# Patient Record
(demographics unavailable — no encounter records)

---

## 2025-02-07 NOTE — HISTORY OF PRESENT ILLNESS
[de-identified] : Pt presents with complaints of neck and upper back pain.  In mid December, had L periscapular pain that radiated to left neck and front of chest.  The pain is variable, provoked by sudden movements, sneezing, flexion.  She stopped exercising 1.5 weeks ago.  Tried massage and chiropractor, otc pain meds.   Denies significant arm pain.  Denies numbness, tingling.

## 2025-02-07 NOTE — DISCUSSION/SUMMARY
[de-identified] : A lengthy discussion was had with the patient regarding her condition.   Rx MRI cervical spine- r/o HNP, stenosis.   Rx MRI L shoulder- r/o RC injury.   Can review results and plan via phone or telehealth. The patient's questions were sought and answered satisfactorily.  Taina RUIZ NP am acting as a scribe for Dr. Frank Schwab.

## 2025-02-07 NOTE — PHYSICAL EXAM
[de-identified] : NVI to the extremities.  L shoulder pain with ROM, external rotation. [de-identified] : Cervical xrays with flex/ext 2/5/2025:  C3-4 subtle retrolisthesis with mild translation

## 2025-02-07 NOTE — HISTORY OF PRESENT ILLNESS
[de-identified] : Pt presents with complaints of neck and upper back pain.  In mid December, had L periscapular pain that radiated to left neck and front of chest.  The pain is variable, provoked by sudden movements, sneezing, flexion.  She stopped exercising 1.5 weeks ago.  Tried massage and chiropractor, otc pain meds.   Denies significant arm pain.  Denies numbness, tingling.

## 2025-04-03 NOTE — HISTORY OF PRESENT ILLNESS
[de-identified] : Pt presents in follow up for left upper back/shoulder pain, periscapular pain aggravated by activity.   She completed 6 weeks of physical therapy.  Although she felt slight improvement after therapy, the pain returned the next day.  She continues to take nsaids, uses heat, ice.    2/5/2025:  Pt presents with complaints of neck and upper back pain. In mid December, had L periscapular pain that radiated to left neck and front of chest. The pain is variable, provoked by sudden movements, sneezing, flexion. She stopped exercising 1.5 weeks ago. Tried massage and chiropractor, otc pain meds. Denies significant arm pain. Denies numbness, tingling.

## 2025-04-03 NOTE — PHYSICAL EXAM
[de-identified] : NVI to the extremities. L shoulder pain with ROM, external rotation. [de-identified] : Cervical xrays with flex/ext 2/5/2025: C3-4 subtle retrolisthesis with mild translation.

## 2025-04-03 NOTE — PHYSICAL EXAM
[de-identified] : NVI to the extremities. L shoulder pain with ROM, external rotation. [de-identified] : Cervical xrays with flex/ext 2/5/2025: C3-4 subtle retrolisthesis with mild translation.

## 2025-04-03 NOTE — HISTORY OF PRESENT ILLNESS
[de-identified] : Pt presents in follow up for left upper back/shoulder pain, periscapular pain aggravated by activity.   She completed 6 weeks of physical therapy.  Although she felt slight improvement after therapy, the pain returned the next day.  She continues to take nsaids, uses heat, ice.    2/5/2025:  Pt presents with complaints of neck and upper back pain. In mid December, had L periscapular pain that radiated to left neck and front of chest. The pain is variable, provoked by sudden movements, sneezing, flexion. She stopped exercising 1.5 weeks ago. Tried massage and chiropractor, otc pain meds. Denies significant arm pain. Denies numbness, tingling.

## 2025-04-03 NOTE — DISCUSSION/SUMMARY
[de-identified] : A lengthy discussion was had with the patient regarding her condition. Her left upper back, shoulder, periscapular pain persists after conservative treatment.  Rx MRI cervical spine- r/o HNP, stenosis. Rx MRI L shoulder- r/o RC injury. Can review results and plan via phone or telehealth.   ITaina NP am acting as a scribe for Dr. Frank Schwab.

## 2025-04-03 NOTE — DISCUSSION/SUMMARY
[de-identified] : A lengthy discussion was had with the patient regarding her condition. Her left upper back, shoulder, periscapular pain persists after conservative treatment.  Rx MRI cervical spine- r/o HNP, stenosis. Rx MRI L shoulder- r/o RC injury. Can review results and plan via phone or telehealth.   ITaina NP am acting as a scribe for Dr. Frank Schwab.